# Patient Record
(demographics unavailable — no encounter records)

---

## 2025-01-12 NOTE — DISCUSSION/SUMMARY
[FreeTextEntry1] : Patient presented for preoperative consultation prior to facial feminization surgery Patient with complicated history of cranial vault remodeling We reviewed the virtual planning with the patient She desires to have the following facial features modified: -Brow -Nose -Cheeks -Jawline -Neck We reviewed these procedures and their risks   FH: noncontributory   SH: no secondary tobacco use,   ROS: General health / Constitutional      no fever, no chills, no unusual weight changes, no energy level changes, no night sweats Skin       No color or pigmentation changes, no pruritis, no rashes, no ulcers, Hair       No changes in color, texture,  distribution, loss Nails       No color changes, brittleness, infection Head       No headaches, no new jaw pain Eyes       Good visual acuity, no color blindness, no corrective lenses, no photophobia, no diplopia, no blurred vision, no infection, pain, no medications, Ear      no tinnitus, no discharge, no pain, no medications Nose      no epistaxis, no rhinorrhea, no rhinitis, no pain, Mouth & Throat      no gingivitis, no gingival bleeding, no ulcers, no voice changes, no changes in oral mucosa or tongue Neck      no stiffness, no pain, no lymphadenitis, no thyroid disorders, Respiratory      no cough, no dyspnea, no wheezing,  no chest pain, cyanosis, no pneumonia, no asthma, Cardiovascular      no chest pain, no palpitations, no irregular rhythm, no tachycardia, no bradycardia, no heart failure, no dyspnea on exertion (MAGANA), no edema Gastrointestinal      no nausea / vomiting, no dysphagia, no reflux / GERD, no abdominal pain, no jaundice Musculoskeletal      no pain in muscles, bones, or joints; no fractures, no dislocations, no muscular weakness, no atrophy Neurological      no paresis, no paralysis, no paresthesia, no seizures, no dizziness, no syncope, no ataxia, no tremor Psychological      no childhood behavioral problems, no irritability, no sleep changes Hematological      no anemia, no bruising, no bleeding tendencies,   PHYSICAL EXAM General WDWN, in no distress,  A & O x 3 (person, place, time) HEENT Head: AT/NC (atraumatic, normocephalic), including TMJ, sensory and motor; Prominent brow and lateral orbital rim type III, temporal hollowing Eyes: EOMI, PERRLA Ears: exterior, nl hearing, Nose: irregular tip shape, with acute nasiolabial angle intranasal exam showed enlarged turbinates and deviated caudal septum Throat & mouth: gd palate elevation, nl tongue mobility, nl tonsil size Prominent mandibular angle with active masseter short lower face Wide chin   Neck: no masses, nl pulses    FFS: -Brow lift -frontal sinus setback -supraorbital brow recontouring -mandibular angle reduction b/l -chin narrowing -revision rhinoplasty, SMR, cartilage grafting -fat grafting  Patient informed of the timing and risks moving forward. All patient questions were answered. Patient was given written instructions for care and follow up visit. We discussed the instructions and the patient confirmed an understanding of them.   Bleeding- It is possible, though unusual, to experience a bleeding episode during or after surgery. Should post-operative bleeding occur, it may require emergency treatment to drain accumulated blood or blood transfusion. Intra-operative blood transfusion may also be required. Do not take any aspirin or anti-inflammatory medications for ten days before or after surgery, as this may increase the risk of bleeding. Non-prescription herbs and dietary supplements can increase the risk of surgical bleeding. Hematoma can occur at any time following injury to the breast. If blood transfusions are necessary to treat blood loss, there is the risk of blood-related infections such as hepatitis and HIV (AIDS). Heparin medications that are used to prevent blood clots in veins can produce bleeding and decreased blood platelets.   Infection- Infection is unusual after surgery. Should an infection occur, additional treatment including antibiotics, hospitalization, or additional surgery may be necessary.   Change Skin Sensation- You may experience a diminished (or loss) of sensitivity of the skin of operative area. Partial or permanent loss of skin sensation can occur after surgery. Skin Contour Irregularities- Contour and shape irregularities may occur after surgery. Visible and palpable wrinkling may occur. Residual skin irregularities at the ends of the incisions or dog ears are always a possibility when there is excessive redundant skin. This may improve with time, or it can be surgically corrected.   Sutures- Most surgical techniques use deep sutures. You may notice these sutures after your surgery. Sutures may spontaneously poke through the skin, become visible or produce irritation that requires suture removal.   Skin Discoloration / Swelling- Some bruising and swelling normally occurs following surgery. The skin in or near the surgical site can appear either lighter or darker than surrounding skin. Although uncommon, swelling and skin discoloration may persist for long periods of time and, in rare situations, may be permanent.   Skin Sensitivity- Itching, tenderness, or exaggerated responses to hot or cold temperatures may occur after surgery. Usually this resolves during healing, but in rare situations it may be chronic.   Scarring- All surgery leaves scars, some more visible than others. Although good wound healing after a surgical procedure is expected, abnormal scars may occur within the skin and deeper tissues. Scars may be unattractive and of different color than the surrounding skin tone. Scar appearance may also vary within the same scar. Scars may be asymmetrical (appear different on the right and left side of the body). There is the possibility of visible marks in the skin from sutures. In some cases scars may require surgical revision or treatment.   Damage to Deeper Structures- There is the potential for injury to deeper structures including, nerves, blood vessels, muscles during any surgical procedure. The potential for this to occur varies according to the type of procedure being performed. Injury to deeper structures may be temporary or permanent.   Delayed Healing- Wound disruption or delayed wound healing is possible. Some areas of the skin may not heal normally and may take a long time to heal. Areas of skin tissue may die. This may require frequent dressing changes or further surgery to remove the non-healed tissue. Individuals who have decreased blood supply to tissue from past surgery or radiation therapy may be at increased risk for wound healing and poor surgical outcome. Smokers have a greater risk of skin loss and wound healing complications.   Fat Necrosis- Fatty tissue found deep in the skin might die. This may produce areas of firmness within the skin. Additional surgery to remove areas of fat necrosis may be necessary. There is the possibility of contour irregularities in the skin that may result from fat necrosis.   Allergic Reactions- In rare cases, local allergies to tape, suture material and glues, blood products, topical preparations or injected agents have been reported. Serious systemic reactions including shock (anaphylaxis) may occur in response to drugs used during surgery and prescription medicines. Allergic reactions may require additional treatment.  We spent a total of 45 minutes doing patient 3D image morphing with the patient, a full examination, and reviewing the CT virtual plan and risks with the patient. Patient seen and examined by me, Dr. Hebert Hardin, personally. Treatment plan reviewed with patient by me. Physician extender was present for assistance only. I attest that the note reflects the visit and our care.

## 2025-02-02 NOTE — DISCUSSION/SUMMARY
[FreeTextEntry1] : Dyana  presents 10 days post-operatively, s/p FFS (brow lift, frontal sinus setback, SOB recontouring, jawline and chin, rhinoplasty with grafting, platysmaplasty, upper and lower lip augmentation, fat transfer to face, malar cheek implants, masseteric resection.) on 1/21/25   Patient recovering well with no significant complaints; moderate swelling of mid to lower face, moderate swelling midline of neck mild numbness along surgical sites (hairline, jawline, submentum), pain well-controlled with prescribed medications (Motrin q6 hours, Gabapentin as needed for increased pain, maximum of 1 tablet every 12 hours) She experienced opening of incision at upper right lip / sutures no longer intact, experiencing drainage of sanguinous fluid, pt reports squeezing the area to free liquid. Discussed with patient to stop manipulating the area with fingers, discontinue sqeezing action, apply bacitracin to area. Follow up in one week; no evidence of infection during todays visit. Area is only mildy dehisced, superficial opening, no bleeding, not recommended to intervene with a closure. Follow up in 1 week.   -Patient endorses taking medication as prescribed. Refill on Gabapentin, however she has discontinued narcotics.   -Maintaining good oral care/hygiene, using Peridex mouthwash as prescribed (AM + PM + after meals)   -Patient is advancing to soft food diet as tolerated. Denies nausea or vomiting.       -Reviewed incision care. Patient has not gotten areas wet. Okay to progress to daily cleansing of face and hairline with gentle cleanser. Okay to use hydrogen peroxide on clean q-tip to gently remove build-up and dried blood (especially at nose and hairline).         -Nasal splint has been removed, as well as [some] non-dissolvable stitches at base of nose. Patient tolerated removal of sutures well.    -Nose-taping applied and demonstrated for patient. Patient to repeat nose-taping daily. Patient given supply of tape.   -Patient cleared to use saline nasal spray prn. Use Afrin prn congestion 2x daily for a maximum for 2 days.   -Educated patient on dissolvable nature of all other sutures.         -Incision sites are healing well, well-approximated with no evidence of infection. Incision sites located at submentum, nostrils, intraorally, and at hairline.   -Negative for signs of inflammation (redness, significant pain or swelling, discharge, foul odor/smell).     -Patient to cover incision sites of scalp with scarf/hat if in direct sunlight.           -Moderate ecchymosis present about periorbital and mid face regions and moderate edema. Reassured patient.         -Patient to continue to use jawbra at all times, except for showering, x 1 more week. At start of POD 14-17, patient can begin to use jawbra at nights only.   -Maintain head elevation while sleeping, at least 30 degrees, using two to three pillows or a wedge pillow.   -Use bacitracin ointment on surgical incisions about nose.       -Patient to contact office or come in sooner than follow-up appointment if she notices discharge/drainage, foul odor/taste, uncontrolled bleeding, severe pain not improved with pain-medication regimen, dramatic increase in swelling, nausea//vomiting, chills/fever, or onset of new symptoms.   -Reviewed common post-op occurrences and adverse reactions. All of patient's questions and concerns were answered and addressed during this appointment.   -Patient pending clearance for scar gels/sunscreen use/ exercise/laser/electrolysis. Patient can anticipate returning to these activities around 6 weeks post-operatively.   -Patient to advance activity as tolerated, but is not encouraged to engage in exercise, heavy lifting, or having head below heart for prolonged duration.       -Patient to call to schedule follow up, follow-up in approximately 7 days.

## 2025-02-08 NOTE — DISCUSSION/SUMMARY
[FreeTextEntry1] : S/P Facial Feminization Surgery: 1/21/2025 (Brow, Nose, Jawline, Neck) Patient is without complains and is happy with the result; Swelling is still present but improving; Still with palpable laryngeal prominence but swollen Incisions are healed Hairline with dissolvable sutures and eschars Advised how to clean the eschars each day Remaining sutures removed Some chin numbness Advised to sleep with head at 30 degrees to reduce swelling; Tolerating a soft diet; Advance to regular diet; Resume normal activities; Take Motrin 400mg prn Continue with Jawbra at nighttime Start nasal saline spray BID Follow up in 3 weeks

## 2025-03-02 NOTE — DISCUSSION/SUMMARY
[FreeTextEntry1] : S/P Facial Feminization Surgery: 5 weeks ago (Brow, Nose, Jawline) Patient is without complains but asking for more procedures already. Mild swelling is still present in the nose and jawline but improving; Incisions are healed, no hypertrophic scars Some chin numbness but has improved Advised to sleep with head at 30 degrees to reduce swelling; Continue regular diet; Continue normal activities; Continue with Jawbra at nighttime prn Continue nasal saline spray BID Follow up in 3 months